# Patient Record
Sex: MALE | Race: WHITE | NOT HISPANIC OR LATINO | Employment: FULL TIME | URBAN - METROPOLITAN AREA
[De-identification: names, ages, dates, MRNs, and addresses within clinical notes are randomized per-mention and may not be internally consistent; named-entity substitution may affect disease eponyms.]

---

## 2021-11-10 ENCOUNTER — APPOINTMENT (OUTPATIENT)
Dept: GENERAL RADIOLOGY | Facility: CLINIC | Age: 41
End: 2021-11-10
Attending: NURSE PRACTITIONER

## 2021-11-10 ENCOUNTER — HOSPITAL ENCOUNTER (EMERGENCY)
Facility: CLINIC | Age: 41
Discharge: HOME | End: 2021-11-10
Attending: NURSE PRACTITIONER | Admitting: NURSE PRACTITIONER

## 2021-11-10 VITALS
DIASTOLIC BLOOD PRESSURE: 91 MMHG | RESPIRATION RATE: 16 BRPM | SYSTOLIC BLOOD PRESSURE: 136 MMHG | HEART RATE: 62 BPM | OXYGEN SATURATION: 97 % | TEMPERATURE: 97.5 F | WEIGHT: 180 LBS

## 2021-11-10 DIAGNOSIS — S63.501A WRIST SPRAIN, RIGHT, INITIAL ENCOUNTER: ICD-10-CM

## 2021-11-10 PROCEDURE — 73110 X-RAY EXAM OF WRIST: CPT | Mod: RT

## 2021-11-10 PROCEDURE — 99283 EMERGENCY DEPT VISIT LOW MDM: CPT | Performed by: NURSE PRACTITIONER

## 2021-11-10 PROCEDURE — 99283 EMERGENCY DEPT VISIT LOW MDM: CPT | Mod: 25 | Performed by: NURSE PRACTITIONER

## 2021-11-10 PROCEDURE — 250N000013 HC RX MED GY IP 250 OP 250 PS 637: Performed by: NURSE PRACTITIONER

## 2021-11-10 RX ADMIN — IBUPROFEN 600 MG: 400 TABLET ORAL at 11:17

## 2021-11-10 ASSESSMENT — ENCOUNTER SYMPTOMS
WOUND: 0
MYALGIAS: 1
JOINT SWELLING: 0
ARTHRALGIAS: 1

## 2021-11-10 NOTE — ED PROVIDER NOTES
History   No chief complaint on file.    HPI  Gregory Hyde is a 41 year old male who presents emergency department for evaluation of right wrist and hand pain.  Prior to arrival patient was using a drill when lost control causing his hand and wrist to twist outwardly.  Pain throughout the right mid metacarpals and diffusely in the right wrist.  Reports decreased  strength.  No numbness or tingling.  No medications prior to arrival.  He is right-hand dominant    Allergies:  No Known Allergies    Problem List:    There are no problems to display for this patient.       Past Medical History:    No past medical history on file.    Past Surgical History:    No past surgical history on file.    Family History:    No family history on file.    Social History:  Marital Status:  Single [1]  Social History     Tobacco Use     Smoking status: Not on file     Smokeless tobacco: Not on file   Substance Use Topics     Alcohol use: Not on file     Drug use: Not on file        Medications:    No current outpatient medications on file.        Review of Systems   Musculoskeletal: Positive for arthralgias and myalgias. Negative for joint swelling.   Skin: Negative for wound.   All other systems reviewed and are negative.      Physical Exam   BP: (!) 140/94  Pulse: 94  Temp: 97.5  F (36.4  C)  Resp: 16  Weight: 81.6 kg (180 lb)  SpO2: 98 %      Physical Exam  Constitutional:       General: He is not in acute distress.     Appearance: Normal appearance.   Cardiovascular:      Rate and Rhythm: Normal rate.   Pulmonary:      Effort: Pulmonary effort is normal.   Musculoskeletal:      Right wrist: Tenderness and bony tenderness present. No swelling, deformity, lacerations, snuff box tenderness or crepitus. Decreased range of motion (d/t pain). Normal pulse.      Left wrist: Normal.      Right hand: Bony tenderness present. No swelling or lacerations. Normal range of motion. Decreased strength (d/t pain) of finger abduction. Normal  sensation. There is no disruption of two-point discrimination. Normal capillary refill.      Comments: Pulses and perfusion are equal bilaterally. No overlying erythema, edema, abrasions, or ecchymosis.    Skin:     General: Skin is warm.      Capillary Refill: Capillary refill takes less than 2 seconds.   Neurological:      General: No focal deficit present.      Mental Status: He is alert.         ED Course        Procedures           Results for orders placed or performed during the hospital encounter of 11/10/21 (from the past 24 hour(s))   XR Wrist Right G/E 3 Views    Narrative    WRIST RIGHT THREE OR MORE VIEWS   11/10/2021 11:25 AM     HISTORY: Pain with movement after drill twisted arm.    COMPARISON: None.      Impression    IMPRESSION: Normal joint spacing and alignment. No acute fracture.    NOELLE CHAVEZ MD         SYSTEM ID:  [id number]       Medications   ibuprofen (ADVIL/MOTRIN) tablet 600 mg (600 mg Oral Given 11/10/21 1117)       Assessments & Plan (with Medical Decision Making)   Gregory Hyde is a 41 year old male who presents emergency department for evaluation of right wrist and hand pain.  Prior to arrival patient was using a drill when lost control causing his hand and wrist to twist outwardly.  Pain throughout the right mid metacarpals and diffusely in the right wrist.  Exam as above.  Negative wrist x-ray.  Hand appears normal.  Likely sprain.  Educated on average course of healing, rest splint provided.  Work note provided.  Ortho follow-up as needed. Return precautions reviewed, all questions answered. Patient is agreeable to plan of care and discharged in no acute distress.     I have reviewed the nursing notes.    I have reviewed the findings, diagnosis, plan and need for follow up with the patient.      There are no discharge medications for this patient.      Final diagnoses:   Wrist sprain, right, initial encounter       11/10/2021   [facility] EMERGENCY DEPT      Lorraine Navarrete, APRN CNP  11/10/21 2080

## 2021-11-10 NOTE — Clinical Note
Gregory Hyde was seen and treated in our emergency department on 11/10/2021.  He may return to work on 11/11/2021.  Please allow Gregory to have light duties for one week due to his right wrist sprain. If light duty cannot be accommodated please allow him to be off for 1 week. If further evaluation is needed, he may follow up with orthopedics.      If you have any questions or concerns, please don't hesitate to call.      Gypsy Quiroga RN

## 2021-11-10 NOTE — ED NOTES
Difficulty moving fingers due to pain in wrist.  Pain radiates up to elbow.  No obvious deformity.

## 2024-07-15 ENCOUNTER — APPOINTMENT (OUTPATIENT)
Dept: GENERAL RADIOLOGY | Facility: CLINIC | Age: 44
End: 2024-07-15
Attending: EMERGENCY MEDICINE
Payer: COMMERCIAL

## 2024-07-15 ENCOUNTER — HOSPITAL ENCOUNTER (EMERGENCY)
Facility: CLINIC | Age: 44
Discharge: HOME | End: 2024-07-15
Attending: EMERGENCY MEDICINE | Admitting: EMERGENCY MEDICINE
Payer: COMMERCIAL

## 2024-07-15 VITALS
BODY MASS INDEX: 25.2 KG/M2 | SYSTOLIC BLOOD PRESSURE: 130 MMHG | OXYGEN SATURATION: 93 % | RESPIRATION RATE: 19 BRPM | WEIGHT: 180 LBS | HEART RATE: 75 BPM | TEMPERATURE: 97.9 F | HEIGHT: 71 IN | DIASTOLIC BLOOD PRESSURE: 101 MMHG

## 2024-07-15 DIAGNOSIS — Z86.79 HISTORY OF CORONARY ARTERY DISEASE: ICD-10-CM

## 2024-07-15 DIAGNOSIS — R07.9 CHEST PAIN, UNSPECIFIED TYPE: ICD-10-CM

## 2024-07-15 DIAGNOSIS — I20.0 UNSTABLE ANGINA (H): ICD-10-CM

## 2024-07-15 LAB
ALBUMIN SERPL BCG-MCNC: 4.1 G/DL (ref 3.5–5.2)
ALP SERPL-CCNC: 89 U/L (ref 40–150)
ALT SERPL W P-5'-P-CCNC: 23 U/L (ref 0–70)
ANION GAP SERPL CALCULATED.3IONS-SCNC: 11 MMOL/L (ref 7–15)
AST SERPL W P-5'-P-CCNC: 18 U/L (ref 0–45)
BASOPHILS # BLD AUTO: 0.1 10E3/UL (ref 0–0.2)
BASOPHILS NFR BLD AUTO: 1 %
BILIRUB SERPL-MCNC: 0.3 MG/DL
BUN SERPL-MCNC: 15.1 MG/DL (ref 6–20)
CALCIUM SERPL-MCNC: 8.7 MG/DL (ref 8.6–10)
CHLORIDE SERPL-SCNC: 98 MMOL/L (ref 98–107)
CREAT SERPL-MCNC: 0.69 MG/DL (ref 0.67–1.17)
DEPRECATED HCO3 PLAS-SCNC: 24 MMOL/L (ref 22–29)
EGFRCR SERPLBLD CKD-EPI 2021: >90 ML/MIN/1.73M2
EOSINOPHIL # BLD AUTO: 0.4 10E3/UL (ref 0–0.7)
EOSINOPHIL NFR BLD AUTO: 4 %
ERYTHROCYTE [DISTWIDTH] IN BLOOD BY AUTOMATED COUNT: 12.1 % (ref 10–15)
GLUCOSE SERPL-MCNC: 469 MG/DL (ref 70–99)
HBA1C MFR BLD: 11.5 %
HCT VFR BLD AUTO: 46.4 % (ref 40–53)
HGB BLD-MCNC: 17.3 G/DL (ref 13.3–17.7)
HOLD SPECIMEN: NORMAL
IMM GRANULOCYTES # BLD: 0.1 10E3/UL
IMM GRANULOCYTES NFR BLD: 1 %
LYMPHOCYTES # BLD AUTO: 3 10E3/UL (ref 0.8–5.3)
LYMPHOCYTES NFR BLD AUTO: 29 %
MCH RBC QN AUTO: 31.7 PG (ref 26.5–33)
MCHC RBC AUTO-ENTMCNC: 37.3 G/DL (ref 31.5–36.5)
MCV RBC AUTO: 85 FL (ref 78–100)
MONOCYTES # BLD AUTO: 0.8 10E3/UL (ref 0–1.3)
MONOCYTES NFR BLD AUTO: 8 %
NEUTROPHILS # BLD AUTO: 5.9 10E3/UL (ref 1.6–8.3)
NEUTROPHILS NFR BLD AUTO: 58 %
NRBC # BLD AUTO: 0 10E3/UL
NRBC BLD AUTO-RTO: 0 /100
PLATELET # BLD AUTO: 293 10E3/UL (ref 150–450)
POTASSIUM SERPL-SCNC: 4.1 MMOL/L (ref 3.4–5.3)
PROT SERPL-MCNC: 6.9 G/DL (ref 6.4–8.3)
RBC # BLD AUTO: 5.45 10E6/UL (ref 4.4–5.9)
SODIUM SERPL-SCNC: 133 MMOL/L (ref 135–145)
TROPONIN T SERPL HS-MCNC: 20 NG/L
TROPONIN T SERPL HS-MCNC: 20 NG/L
WBC # BLD AUTO: 10.2 10E3/UL (ref 4–11)

## 2024-07-15 PROCEDURE — 82040 ASSAY OF SERUM ALBUMIN: CPT | Performed by: EMERGENCY MEDICINE

## 2024-07-15 PROCEDURE — 83036 HEMOGLOBIN GLYCOSYLATED A1C: CPT | Performed by: EMERGENCY MEDICINE

## 2024-07-15 PROCEDURE — 96360 HYDRATION IV INFUSION INIT: CPT | Performed by: EMERGENCY MEDICINE

## 2024-07-15 PROCEDURE — 36415 COLL VENOUS BLD VENIPUNCTURE: CPT | Performed by: EMERGENCY MEDICINE

## 2024-07-15 PROCEDURE — 250N000013 HC RX MED GY IP 250 OP 250 PS 637: Performed by: EMERGENCY MEDICINE

## 2024-07-15 PROCEDURE — 71046 X-RAY EXAM CHEST 2 VIEWS: CPT

## 2024-07-15 PROCEDURE — 99285 EMERGENCY DEPT VISIT HI MDM: CPT | Performed by: EMERGENCY MEDICINE

## 2024-07-15 PROCEDURE — 93010 ELECTROCARDIOGRAM REPORT: CPT | Performed by: EMERGENCY MEDICINE

## 2024-07-15 PROCEDURE — 85025 COMPLETE CBC W/AUTO DIFF WBC: CPT | Performed by: EMERGENCY MEDICINE

## 2024-07-15 PROCEDURE — 99285 EMERGENCY DEPT VISIT HI MDM: CPT | Mod: 25 | Performed by: EMERGENCY MEDICINE

## 2024-07-15 PROCEDURE — 93005 ELECTROCARDIOGRAM TRACING: CPT | Performed by: EMERGENCY MEDICINE

## 2024-07-15 PROCEDURE — 96361 HYDRATE IV INFUSION ADD-ON: CPT | Performed by: EMERGENCY MEDICINE

## 2024-07-15 PROCEDURE — 258N000003 HC RX IP 258 OP 636: Performed by: EMERGENCY MEDICINE

## 2024-07-15 PROCEDURE — 84484 ASSAY OF TROPONIN QUANT: CPT | Performed by: EMERGENCY MEDICINE

## 2024-07-15 RX ORDER — HEPARIN SODIUM 10000 [USP'U]/100ML
0-5000 INJECTION, SOLUTION INTRAVENOUS CONTINUOUS
Status: DISCONTINUED | OUTPATIENT
Start: 2024-07-15 | End: 2024-07-15 | Stop reason: HOSPADM

## 2024-07-15 RX ORDER — NICOTINE POLACRILEX 4 MG
15-30 LOZENGE BUCCAL
Status: DISCONTINUED | OUTPATIENT
Start: 2024-07-15 | End: 2024-07-15 | Stop reason: HOSPADM

## 2024-07-15 RX ORDER — NITROGLYCERIN 0.4 MG/1
0.4 TABLET SUBLINGUAL EVERY 5 MIN PRN
Status: DISCONTINUED | OUTPATIENT
Start: 2024-07-15 | End: 2024-07-15 | Stop reason: HOSPADM

## 2024-07-15 RX ORDER — NITROGLYCERIN 20 MG/100ML
10-200 INJECTION INTRAVENOUS CONTINUOUS
Status: DISCONTINUED | OUTPATIENT
Start: 2024-07-15 | End: 2024-07-15 | Stop reason: HOSPADM

## 2024-07-15 RX ORDER — ACETAMINOPHEN 500 MG
1000 TABLET ORAL ONCE
Status: COMPLETED | OUTPATIENT
Start: 2024-07-15 | End: 2024-07-15

## 2024-07-15 RX ORDER — SODIUM CHLORIDE, SODIUM LACTATE, POTASSIUM CHLORIDE, CALCIUM CHLORIDE 600; 310; 30; 20 MG/100ML; MG/100ML; MG/100ML; MG/100ML
INJECTION, SOLUTION INTRAVENOUS CONTINUOUS
Status: DISCONTINUED | OUTPATIENT
Start: 2024-07-15 | End: 2024-07-15 | Stop reason: HOSPADM

## 2024-07-15 RX ORDER — DEXTROSE MONOHYDRATE 25 G/50ML
25-50 INJECTION, SOLUTION INTRAVENOUS
Status: DISCONTINUED | OUTPATIENT
Start: 2024-07-15 | End: 2024-07-15 | Stop reason: HOSPADM

## 2024-07-15 RX ORDER — SODIUM CHLORIDE, SODIUM LACTATE, POTASSIUM CHLORIDE, CALCIUM CHLORIDE 600; 310; 30; 20 MG/100ML; MG/100ML; MG/100ML; MG/100ML
1000 INJECTION, SOLUTION INTRAVENOUS CONTINUOUS
Status: DISCONTINUED | OUTPATIENT
Start: 2024-07-15 | End: 2024-07-15 | Stop reason: HOSPADM

## 2024-07-15 RX ADMIN — NITROGLYCERIN 0.4 MG: 0.4 TABLET SUBLINGUAL at 10:01

## 2024-07-15 RX ADMIN — ACETAMINOPHEN 1000 MG: 500 TABLET, FILM COATED ORAL at 10:01

## 2024-07-15 RX ADMIN — SODIUM CHLORIDE, POTASSIUM CHLORIDE, SODIUM LACTATE AND CALCIUM CHLORIDE 1000 ML: 600; 310; 30; 20 INJECTION, SOLUTION INTRAVENOUS at 10:01

## 2024-07-15 ASSESSMENT — ENCOUNTER SYMPTOMS
HEMATOLOGIC/LYMPHATIC NEGATIVE: 1
ENDOCRINE NEGATIVE: 1
MUSCULOSKELETAL NEGATIVE: 1
GASTROINTESTINAL NEGATIVE: 1
ALLERGIC/IMMUNOLOGIC NEGATIVE: 1
NEUROLOGICAL NEGATIVE: 1
RESPIRATORY NEGATIVE: 1
CONSTITUTIONAL NEGATIVE: 1
PSYCHIATRIC NEGATIVE: 1

## 2024-07-15 ASSESSMENT — ACTIVITIES OF DAILY LIVING (ADL)
ADLS_ACUITY_SCORE: 35

## 2024-07-15 ASSESSMENT — COLUMBIA-SUICIDE SEVERITY RATING SCALE - C-SSRS
1. IN THE PAST MONTH, HAVE YOU WISHED YOU WERE DEAD OR WISHED YOU COULD GO TO SLEEP AND NOT WAKE UP?: NO
2. HAVE YOU ACTUALLY HAD ANY THOUGHTS OF KILLING YOURSELF IN THE PAST MONTH?: NO
6. HAVE YOU EVER DONE ANYTHING, STARTED TO DO ANYTHING, OR PREPARED TO DO ANYTHING TO END YOUR LIFE?: NO

## 2024-07-15 NOTE — ED PROVIDER NOTES
"  History     Chief Complaint   Patient presents with    Chest Pain     Sharp CP approx 30 min ago while working. Hx of MI. Pt has uncontrolled DM2. Pt took nitro. Pt had episode of emesis x1. States \"last time CP was way worse, and went through to his back.\"     HPI  Gregory Hyde is a 44 year old male who presents with exertional chest pain 30 minutes prior to presenting for care.  Patient reported on arrival history of myocardial infarction and uncontrolled type 2 diabetes.  Patient reports he took a nitroglycerin prior to arrival.    On examination on arrival patient reports he was working at his [employer] doing some light work and exhausted around 8 AM when he developed sudden left sided chest pain.  He reported his pain on arrival to 2 out of 10 after receiving sublingual nitroglycerin from EMS.  Patient reports that onset of pain was a 5-6 out of 10.  He reports this was similar to when he was treated for heart attack at [facility] in December 2022.  Patient reports he did not receive a stent.  He admits he is not compliant with his prescribed medications but does take Seroquel for sleep.  He smokes at least a pack per day.  With ongoing symptoms after calling the nurse advice line he arrives for further care.  Patient reports that he took 2 full-strength aspirin prior to ED presentation for care.    Allergies:  No Known Allergies    Problem List:    There are no problems to display for this patient.       Past Medical History:    No past medical history on file.    Past Surgical History:    No past surgical history on file.    Family History:    No family history on file.    Social History:  Marital Status:  Single [1]        Medications:    No current outpatient medications on file.        Review of Systems   Constitutional: Negative.    HENT: Negative.     Respiratory: Negative.     Cardiovascular:  Positive for chest pain.   Gastrointestinal: Negative.    Endocrine: Negative.  " "  Genitourinary: Negative.    Musculoskeletal: Negative.    Skin: Negative.    Allergic/Immunologic: Negative.    Neurological: Negative.    Hematological: Negative.    Psychiatric/Behavioral: Negative.     All other systems reviewed and are negative.      Physical Exam   BP: (!) 130/93  Pulse: 90  Temp: 97.9  F (36.6  C)  Resp: 16  Height: 180.3 cm (5' 11\")  Weight: 81.6 kg (180 lb)  SpO2: 96 %      Physical Exam  Constitutional:       General: He is not in acute distress.     Appearance: He is well-developed. He is not ill-appearing, toxic-appearing or diaphoretic.   HENT:      Head: Normocephalic and atraumatic.   Eyes:      Extraocular Movements: Extraocular movements intact.      Pupils: Pupils are equal, round, and reactive to light.   Cardiovascular:      Rate and Rhythm: Normal rate and regular rhythm.      Heart sounds: Normal heart sounds.   Pulmonary:      Effort: Pulmonary effort is normal.      Breath sounds: Normal breath sounds.   Chest:      Chest wall: No mass, deformity, tenderness, crepitus or edema. There is no dullness to percussion.   Abdominal:      Palpations: Abdomen is soft.   Musculoskeletal:      Cervical back: Normal range of motion and neck supple.   Skin:     Capillary Refill: Capillary refill takes less than 2 seconds.      Coloration: Skin is not cyanotic or pale.      Findings: No ecchymosis, erythema or rash.      Nails: There is no clubbing.   Neurological:      General: No focal deficit present.      Mental Status: He is alert and oriented to person, place, and time.   Psychiatric:         Mood and Affect: Mood normal. Mood is not anxious.         Behavior: Behavior is agitated.         ED Course        Procedures              EKG Interpretation:      Interpreted by Marvin Solares MD  Time reviewed: 0950  Symptoms at time of EKG: chest pain  Rhythm: normal sinus   Rate: Normal  Axis: Normal  Ectopy: none  Conduction: normal  ST Segments/ T Waves: Hyperacute ST segments in " V2 and T wave inversion aVR, hyperacute ST segments in V2 and T wave flattening lead III  Q Waves: nonspecific  Comparison to prior: No old EKG available    Clinical Impression: Hyperacute ST segment without reciprocal change and no old viewable EKG for comparison.         EKG Interpretation:      Interpreted by Marvin Solares MD  Time reviewed:1010   Symptoms at time of EKG: left sided chest pain   Rhythm: Normal sinus   Rate: Normal  Axis: Normal  Ectopy: None  Conduction: Normal  ST Segments/ T Waves: ST flattening with hyperacute ST changes in the inferior leads and precordial  Q Waves: Nonspecific  Comparison to prior: When compared with EKG obtained on arrival at 0942 EKG change appears to be dynamic although does not meet criteria for STEMI    Clinical Impression: Dynamic ST changes in the inferior and precordial leads.           EKG Interpretation:      Interpreted by Marvin Solares MD  Time reviewed:1040   Symptoms at time of EKG: chest pain   Rhythm: Normal sinus   Rate: Normal  Axis: Normal  Ectopy: None  Conduction: Nonspecific interventricular conduction block  ST Segments/ T Waves: Non-specific ST-T wave changes  Q Waves: Nonspecific  Comparison to prior: When compared to EKG obtained on arrival  on arrival at 0942, and 1009-there is no evolving ST elevation.  Old infarct    Clinical Impression: no acute changes was or evolving or dynamic change from EKG obtained serially during ED course    Critical Care time:  30 minutes.     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   By MRI 12/13/2022      Coronary artery disease involving native coronary artery 12/13/2022   Angiogram 12/2022  Cath - 60% RCA lesion; mild disease elswhere           ED medications:  Medications   lactated ringers infusion 1,000 mL (has no administration in time range)   nitroGLYcerin (NITROSTAT) sublingual tablet 0.4 mg (0.4 mg Sublingual $Given 7/15/24 1001)   lactated ringers BOLUS  1,000 mL (0 mLs Intravenous Stopped 7/15/24 1203)   acetaminophen (TYLENOL) tablet 1,000 mg (1,000 mg Oral $Given 7/15/24 1001)        ED Vitals:  Vitals:    07/15/24 1300 07/15/24 1315 07/15/24 1330 07/15/24 1345   BP: 114/70 105/84 121/80 (!) 130/101   Pulse: 62 71 74 75   Resp: 13 14 13 19   Temp:       TempSrc:       SpO2: 96% 96% 96% 93%   Weight:       Height:         Vitals:    07/15/24 1300 07/15/24 1315 07/15/24 1330 07/15/24 1345   BP: 114/70 105/84 121/80 (!) 130/101   Pulse: 62 71 74 75   Resp: 13 14 13 19   Temp:       TempSrc:       SpO2: 96% 96% 96% 93%   Weight:       Height:           ED labs and imaging:  Results for orders placed or performed during the hospital encounter of 07/15/24 (from the past 24 hour(s))   [place] Draw    Narrative    The following orders were created for panel order [place] Draw.  Procedure                               Abnormality         Status                     ---------                               -----------         ------                     Extra Blue Top Tube[[id number]]                              Final result               Extra Green Top (Lithium...[[id number]]                      Final result               Extra Purple Top Tube[[id number]]                            Final result                 Please view results for these tests on the individual orders.   Extra Blue Top Tube   Result Value Ref Range    Hold Specimen JIC    Extra Green Top (Lithium Heparin) Tube   Result Value Ref Range    Hold Specimen JIC    Extra Purple Top Tube   Result Value Ref Range    Hold Specimen JIC    CBC with platelets, differential    Narrative    The following orders were created for panel order CBC with platelets, differential.  Procedure                               Abnormality         Status                     ---------                               -----------         ------                     CBC with platelets and d...[[id number]]  Abnormal            Final result                  Please view results for these tests on the individual orders.   Comprehensive metabolic panel   Result Value Ref Range    Sodium 133 (L) 135 - 145 mmol/L    Potassium 4.1 3.4 - 5.3 mmol/L    Carbon Dioxide (CO2) 24 22 - 29 mmol/L    Anion Gap 11 7 - 15 mmol/L    Urea Nitrogen 15.1 6.0 - 20.0 mg/dL    Creatinine 0.69 0.67 - 1.17 mg/dL    GFR Estimate >90 >60 mL/min/1.73m2    Calcium 8.7 8.6 - 10.0 mg/dL    Chloride 98 98 - 107 mmol/L    Glucose 469 (H) 70 - 99 mg/dL    Alkaline Phosphatase 89 40 - 150 U/L    AST 18 0 - 45 U/L    ALT 23 0 - 70 U/L    Protein Total 6.9 6.4 - 8.3 g/dL    Albumin 4.1 3.5 - 5.2 g/dL    Bilirubin Total 0.3 <=1.2 mg/dL   Troponin T, High Sensitivity   Result Value Ref Range    Troponin T, High Sensitivity 20 <=22 ng/L   CBC with platelets and differential   Result Value Ref Range    WBC Count 10.2 4.0 - 11.0 10e3/uL    RBC Count 5.45 4.40 - 5.90 10e6/uL    Hemoglobin 17.3 13.3 - 17.7 g/dL    Hematocrit 46.4 40.0 - 53.0 %    MCV 85 78 - 100 fL    MCH 31.7 26.5 - 33.0 pg    MCHC 37.3 (H) 31.5 - 36.5 g/dL    RDW 12.1 10.0 - 15.0 %    Platelet Count 293 150 - 450 10e3/uL    % Neutrophils 58 %    % Lymphocytes 29 %    % Monocytes 8 %    % Eosinophils 4 %    % Basophils 1 %    % Immature Granulocytes 1 %    NRBCs per 100 WBC 0 <1 /100    Absolute Neutrophils 5.9 1.6 - 8.3 10e3/uL    Absolute Lymphocytes 3.0 0.8 - 5.3 10e3/uL    Absolute Monocytes 0.8 0.0 - 1.3 10e3/uL    Absolute Eosinophils 0.4 0.0 - 0.7 10e3/uL    Absolute Basophils 0.1 0.0 - 0.2 10e3/uL    Absolute Immature Granulocytes 0.1 <=0.4 10e3/uL    Absolute NRBCs 0.0 10e3/uL   Hemoglobin A1c   Result Value Ref Range    Hemoglobin A1C 11.5 (H) <5.7 %   Chest XR,  PA & LAT    Narrative    CHEST TWO VIEWS 7/15/2024 11:06 AM     HISTORY: H of CAD. Sudden onset of left anterior chest pain. Evaluate  for acute cardiopulmonary process    COMPARISON: None.      Impression    IMPRESSION: Normal chest. Lungs are clear. Normal heart  size and  pulmonary vascularity. No pleural effusions. No significant osseous  pathology is identified.    CANDE CARRERA MD         SYSTEM ID:  [id number]   Troponin T, High Sensitivity   Result Value Ref Range    Troponin T, High Sensitivity 20 <=22 ng/L     Assessments & Plan (with Medical Decision Making)   Assessment Summary and clinical Impression: 44-year-old male who presented with acute chest discomfort while working prior to presenting for care.  He reported sharp discomfort last about 30 minutes and that he took a nitroglycerin prior to arrival.  He reports a history of uncontrolled type 2 diabetes and a history of MI.  On arrival his blood pressure was 130/93.  Patient expressed that he had been noncompliant with his prescribed medications and with a known history of coronary disease with PCI in December 12/2022 although no stenting symptoms suspicious for angina.  Workup was initiated due to concern for acute coronary syndrome revealing no acute elevation in his serial cardiac enzymes.  With prior hospitalization for non-ST elevation MI in 12/11/23-multiple diagnosis of myocarditis by MRI with stable and reassuring cardiac workup with no concerning story and uncontrolled diabetes after discussion with cardiology at  [facility] where patient had prior PCI  and cardiac care he was transferred to [facility] for further cardiac rule outs including  provocative stress testing.  While preparing for transfer, patient left AMA stating he had to go smoke and did not want to continue course of care.    ED course and plan:  Reviewed the medical record EKG on arrival  revealed hyperacute ST segments without true reciprocal changes.  pain was rated a 2 out of 10 on arrival although at onset was a 5 out of 10.  Troponin on arrival with symptom onset at 8 AM and 2 hours prior was within normal limits.  Troponin was trended to ensure there was no evolution given concerning story and history. Serial EKG   during ED course of care did not reveal any evolving ST elevation MI or dynamic change.  A1c was 11.5 his glucose on arrival was 469 which is not unexpected given patient's noncompliance with medications with no anion gap.  History and exam is not suspicious for diabetic emergency.  Follow-up troponin 2hour from arrival revealed no positive delta and within normal limits.  Given his poorly controlled diabetes and concerning story although symptoms seem to improve with nitroglycerin and my suspicion that he has progressive coronary disease I spoke with cardiology at [facility]-patient had previously undergone cardiology care   Reviewed patient's medical record through [facility] Everywhere - hospital course 12/11/22. Reviewed cardiology visit on 1/25/2023-patient's coronary angiogram and revealed 60% RCA lesion without disease elsewhere.  Patient suggested myocarditis of infarction with no other  findings to explain patient's chest discomfort on presentation with care.      Spoke with Cardiology at [facility]- Dr. GILLES aEst- at 1.57pm we reviewed admission for further program testing was urgent PCI given patient's uncontrolled diabetes concerning angiogram 2 years ago and ongoing symptoms.  Dr Esat supported transfer and admission for further care.  While preparing disposition planning patient had ongoing symptoms and was ultimately started on intravenous nitroglycerin and heparin with concern for unstable angina. After discussion with patient and partner- Amy Wright RN- who later arrived during ED course. Updated on plan to transfer for further care.    At 2.28pm notified by primary RN that patient was pulling out his IV and wanted to leave because he wanted to go outside to catch  his breath.  Patient appeared to want to go outside to smoke.  He was offered nicotine patch and nicotine gum which she declined.  After attempts to redirect and reviewing rationale for needing more medical care due to concern for acute coronary  syndrome patient left AMA. See nursing notes/care team notes for further details.    Disclaimer: This note consists of symbols derived from keyboarding, dictation and/or voice recognition software. As a result, there may be errors in the script that have gone undetected. Please consider this when interpreting information found in this chart.   I have reviewed the nursing notes.    I have reviewed the findings, diagnosis, plan and need for follow up with the patient.           Medical Decision Making  The patient's presentation was of high complexity (chest pain, known history of coronary artery disease,).    The patient's evaluation involved:  ordering and/or review of 2 test(s) in this encounter (diagnostic imaging and labs, medications to manage symptoms)    The patient's management necessitated high risk (concern for acute coronary syndrome symptoms suspicious for angina, therapeutic measures to manage her symptoms).        New Prescriptions    No medications on file       Final diagnoses:   Chest pain, unspecified type - Acute left-sided chest pain and left scapular pain at 8am   History of coronary artery disease - PCI without stenting by report at [facility] December 2022       7/15/2024   [facility] EMERGENCY DEPT       Marvin Solares MD  07/15/24 5064

## 2024-07-15 NOTE — ED NOTES
Pt states that he wants to leave and go outside to get fresh air.  Pt unable to reason or listen to nursing staff.  Pt took IV out of arm standing in hallway.  Pt states he has anxiety, RN's inform him we can talk with MD and help him with symptoms or with nicotine withdrawal.  Pt left AMA.  RN spoke to patient s/o.  Questions and concerns answered.